# Patient Record
Sex: MALE | Race: BLACK OR AFRICAN AMERICAN | URBAN - METROPOLITAN AREA
[De-identification: names, ages, dates, MRNs, and addresses within clinical notes are randomized per-mention and may not be internally consistent; named-entity substitution may affect disease eponyms.]

---

## 2019-06-08 ENCOUNTER — NURSE TRIAGE (OUTPATIENT)
Dept: NURSING | Facility: CLINIC | Age: 29
End: 2019-06-08

## 2019-06-09 NOTE — TELEPHONE ENCOUNTER
Caller  states  had lab test at a  clinic and wants results  No EMR available  On this patient   Patient admits he uses  Multiple names and cannot recal what name/address /phone number he used for lab tests   Caller  Advised to go to clinic in person and see if they can find him  Viky Cade RN  FNA

## 2019-06-09 NOTE — TELEPHONE ENCOUNTER
Caller  states  had lab test at a  clinic and wants results  No EMR available  On this patient   Patient admits he uses  Multiple names and cannot recal what name/address /phone number he used for lab tests   Caller  Advised to go to clinic in person and see if they can find him  Lashonda Bledsoe RN  FNA

## 2020-03-11 ENCOUNTER — HEALTH MAINTENANCE LETTER (OUTPATIENT)
Age: 30
End: 2020-03-11

## 2021-01-03 ENCOUNTER — HEALTH MAINTENANCE LETTER (OUTPATIENT)
Age: 31
End: 2021-01-03

## 2021-04-25 ENCOUNTER — HEALTH MAINTENANCE LETTER (OUTPATIENT)
Age: 31
End: 2021-04-25

## 2021-10-10 ENCOUNTER — HEALTH MAINTENANCE LETTER (OUTPATIENT)
Age: 31
End: 2021-10-10

## 2022-05-21 ENCOUNTER — HEALTH MAINTENANCE LETTER (OUTPATIENT)
Age: 32
End: 2022-05-21

## 2022-09-18 ENCOUNTER — HEALTH MAINTENANCE LETTER (OUTPATIENT)
Age: 32
End: 2022-09-18

## 2023-06-04 ENCOUNTER — HEALTH MAINTENANCE LETTER (OUTPATIENT)
Age: 33
End: 2023-06-04